# Patient Record
Sex: MALE | Race: WHITE | NOT HISPANIC OR LATINO | Employment: OTHER | URBAN - METROPOLITAN AREA
[De-identification: names, ages, dates, MRNs, and addresses within clinical notes are randomized per-mention and may not be internally consistent; named-entity substitution may affect disease eponyms.]

---

## 2022-01-11 ENCOUNTER — APPOINTMENT (OUTPATIENT)
Dept: GENERAL RADIOLOGY | Facility: CLINIC | Age: 67
End: 2022-01-11
Attending: EMERGENCY MEDICINE
Payer: COMMERCIAL

## 2022-01-11 ENCOUNTER — HOSPITAL ENCOUNTER (EMERGENCY)
Facility: CLINIC | Age: 67
Discharge: HOME | End: 2022-01-11
Attending: EMERGENCY MEDICINE | Admitting: EMERGENCY MEDICINE
Payer: COMMERCIAL

## 2022-01-11 VITALS
TEMPERATURE: 98.2 F | DIASTOLIC BLOOD PRESSURE: 96 MMHG | HEIGHT: 70 IN | WEIGHT: 144 LBS | SYSTOLIC BLOOD PRESSURE: 158 MMHG | HEART RATE: 77 BPM | OXYGEN SATURATION: 93 % | BODY MASS INDEX: 20.62 KG/M2 | RESPIRATION RATE: 20 BRPM

## 2022-01-11 DIAGNOSIS — J20.9 BRONCHITIS WITH BRONCHOSPASM: ICD-10-CM

## 2022-01-11 LAB
ANION GAP SERPL CALCULATED.3IONS-SCNC: 6 MMOL/L (ref 3–14)
BASOPHILS # BLD AUTO: 0.1 10E3/UL (ref 0–0.2)
BASOPHILS NFR BLD AUTO: 1 %
BUN SERPL-MCNC: 9 MG/DL (ref 7–30)
CALCIUM SERPL-MCNC: 9.9 MG/DL (ref 8.5–10.1)
CHLORIDE BLD-SCNC: 105 MMOL/L (ref 94–109)
CO2 SERPL-SCNC: 26 MMOL/L (ref 20–32)
CREAT SERPL-MCNC: 0.97 MG/DL (ref 0.66–1.25)
EOSINOPHIL # BLD AUTO: 0.4 10E3/UL (ref 0–0.7)
EOSINOPHIL NFR BLD AUTO: 5 %
ERYTHROCYTE [DISTWIDTH] IN BLOOD BY AUTOMATED COUNT: 14.8 % (ref 10–15)
GFR SERPL CREATININE-BSD FRML MDRD: 87 ML/MIN/1.73M2
GLUCOSE BLD-MCNC: 116 MG/DL (ref 70–99)
HCT VFR BLD AUTO: 50.6 % (ref 40–53)
HGB BLD-MCNC: 16.2 G/DL (ref 13.3–17.7)
HOLD SPECIMEN: NORMAL
IMM GRANULOCYTES # BLD: 0 10E3/UL
IMM GRANULOCYTES NFR BLD: 0 %
LYMPHOCYTES # BLD AUTO: 1 10E3/UL (ref 0.8–5.3)
LYMPHOCYTES NFR BLD AUTO: 15 %
MCH RBC QN AUTO: 30.4 PG (ref 26.5–33)
MCHC RBC AUTO-ENTMCNC: 32 G/DL (ref 31.5–36.5)
MCV RBC AUTO: 95 FL (ref 78–100)
MONOCYTES # BLD AUTO: 0.8 10E3/UL (ref 0–1.3)
MONOCYTES NFR BLD AUTO: 12 %
NEUTROPHILS # BLD AUTO: 4.4 10E3/UL (ref 1.6–8.3)
NEUTROPHILS NFR BLD AUTO: 67 %
NRBC # BLD AUTO: 0 10E3/UL
NRBC BLD AUTO-RTO: 0 /100
PLATELET # BLD AUTO: 396 10E3/UL (ref 150–450)
POTASSIUM BLD-SCNC: 4.2 MMOL/L (ref 3.4–5.3)
RBC # BLD AUTO: 5.33 10E6/UL (ref 4.4–5.9)
SODIUM SERPL-SCNC: 137 MMOL/L (ref 133–144)
TROPONIN I SERPL HS-MCNC: 5 NG/L
WBC # BLD AUTO: 6.7 10E3/UL (ref 4–11)

## 2022-01-11 PROCEDURE — 99285 EMERGENCY DEPT VISIT HI MDM: CPT | Mod: 25

## 2022-01-11 PROCEDURE — 250N000012 HC RX MED GY IP 250 OP 636 PS 637: Performed by: EMERGENCY MEDICINE

## 2022-01-11 PROCEDURE — 250N000009 HC RX 250: Performed by: EMERGENCY MEDICINE

## 2022-01-11 PROCEDURE — 93005 ELECTROCARDIOGRAM TRACING: CPT

## 2022-01-11 PROCEDURE — 80048 BASIC METABOLIC PNL TOTAL CA: CPT | Performed by: EMERGENCY MEDICINE

## 2022-01-11 PROCEDURE — 94640 AIRWAY INHALATION TREATMENT: CPT

## 2022-01-11 PROCEDURE — 71046 X-RAY EXAM CHEST 2 VIEWS: CPT

## 2022-01-11 PROCEDURE — 85025 COMPLETE CBC W/AUTO DIFF WBC: CPT | Performed by: EMERGENCY MEDICINE

## 2022-01-11 PROCEDURE — 84484 ASSAY OF TROPONIN QUANT: CPT | Performed by: EMERGENCY MEDICINE

## 2022-01-11 PROCEDURE — 36415 COLL VENOUS BLD VENIPUNCTURE: CPT | Performed by: EMERGENCY MEDICINE

## 2022-01-11 RX ORDER — IPRATROPIUM BROMIDE AND ALBUTEROL SULFATE 2.5; .5 MG/3ML; MG/3ML
3 SOLUTION RESPIRATORY (INHALATION) ONCE
Status: COMPLETED | OUTPATIENT
Start: 2022-01-11 | End: 2022-01-11

## 2022-01-11 RX ORDER — PREDNISONE 20 MG/1
60 TABLET ORAL ONCE
Status: COMPLETED | OUTPATIENT
Start: 2022-01-11 | End: 2022-01-11

## 2022-01-11 RX ORDER — PREDNISONE 10 MG/1
TABLET ORAL
Qty: 7 TABLET | Refills: 0 | Status: SHIPPED | OUTPATIENT
Start: 2022-01-11

## 2022-01-11 RX ADMIN — IPRATROPIUM BROMIDE AND ALBUTEROL SULFATE 3 ML: .5; 3 SOLUTION RESPIRATORY (INHALATION) at 15:42

## 2022-01-11 RX ADMIN — PREDNISONE 60 MG: 20 TABLET ORAL at 16:40

## 2022-01-11 ASSESSMENT — ENCOUNTER SYMPTOMS
SHORTNESS OF BREATH: 1
COUGH: 1
BACK PAIN: 0
WHEEZING: 1
APPETITE CHANGE: 1
ABDOMINAL PAIN: 0
NAUSEA: 0
PALPITATIONS: 0
UNEXPECTED WEIGHT CHANGE: 1

## 2022-01-11 ASSESSMENT — MIFFLIN-ST. JEOR: SCORE: 1444.43

## 2022-01-11 NOTE — ED PROVIDER NOTES
History   Chief Complaint:  Abnormal Ekg and Shortness of Breath       HPI   Jacob Minor is a 66 year old male with history of anxiety, polycythemia vera, chronic sinusisits eosinophilic pneumonia, alcohol abuse, and PTSD who presents with shortness of breath. The patient states that he began experiencing a productive cough, shortness of breath and wheezing 2 weeks ago after receiving his flu vaccine. He measured his SpO2 which read 89 and he was able to bring it up to 91 after walking. He visited Urgent Care last week and had an unremarkable chest XR, so he was sent home with an albuterol inhaler. He has continued to have shortness of breath and wheezing for the past week. He went to Urgent Care again today and a repeat EKG showed abnormal results so they reccommended he come to the ED. He states that his shortness of breath is worse with exertion and tried using his albuterol inhaler today with no relief. He also mentions having a decrease in his appetite and has lost weight. The patient has a history of chronic eosinophilic pneumonia and increased his prednisone prescription to manage this. He follows up with a doctor regularly. Denies any abdominal pain, leg swelling, nausea, back pain, or palpitations. Also denies any recent travel or history of blood clots. Patient does drink 5-6 beers everyday.    Review of Systems   Constitutional: Positive for appetite change and unexpected weight change.   Respiratory: Positive for cough, shortness of breath and wheezing.    Cardiovascular: Negative for palpitations and leg swelling.   Gastrointestinal: Negative for abdominal pain and nausea.   Musculoskeletal: Negative for back pain.   All other systems reviewed and are negative.      Allergies:  Sulfa Drugs    Medications:  Sildenafil   Gabapentin   Hydrea   Prednisone     Past Medical History:     Anxiety   Sleep apnea   Eosinophilic pneumonia  Depressive disorder   Polycythemia vera   Sinusitis   Male erectile  "disorder   Keratosis    Alcohol abuse  PTSD   Stroke   PFO     Family History:    Cancer   Cataract   Macular degeneration   Stroke   Alcohol abuse   Depression     Social History:  Patient presents with his wife   Denies any tobacco use   Patient does drink alcohol     Physical Exam     Patient Vitals for the past 24 hrs:   BP Temp Temp src Pulse Resp SpO2 Height Weight   01/11/22 1630 (!) 153/91 -- -- 89 17 90 % -- --   01/11/22 1615 -- -- -- 82 22 92 % -- --   01/11/22 1600 (!) 167/102 -- -- 97 16 93 % -- --   01/11/22 1545 (!) 138/95 -- -- 79 15 92 % -- --   01/11/22 1530 (!) 146/94 -- -- 77 13 91 % -- --   01/11/22 1500 (!) 163/100 -- -- 88 20 92 % -- --   01/11/22 1430 (!) 155/144 -- -- 105 24 93 % -- --   01/11/22 1400 (!) 178/95 -- -- 85 26 93 % -- --   01/11/22 1330 (!) 168/97 -- -- 77 15 93 % -- --   01/11/22 1300 (!) 155/93 -- -- 79 15 92 % -- --   01/11/22 1255 (!) 178/99 -- -- 80 23 94 % -- --   01/11/22 1240 (!) 194/108 -- -- 81 18 94 % -- --   01/11/22 1223 (!) 191/107 98.2  F (36.8  C) Oral 89 18 94 % 1.778 m (5' 10\") 65.3 kg (144 lb)       Physical Exam  Eye:  Pupils are equal, round, and reactive.  Extraocular movements intact.    ENT:  No rhinorrhea.  Moist mucus membranes.  Normal tongue and tonsil.    Cardiac:  Regular rate and rhythm.  No murmurs, gallops, or rubs.    Pulmonary:  Clear to auscultation bilaterally.  No wheezes, rales, or rhonchi.    Abdomen:  Positive bowel sounds.  Abdomen is soft and non-distended, without focal tenderness.    Musculoskeletal:  Normal movement of all extremities without evidence for deficit.    Skin:  Warm and dry without rashes.    Neurologic:  Non-focal exam without asymmetric weakness or numbness.     Psychiatric:  Normal affect with appropriate interaction with examiner.    Emergency Department Course   ECG  ECG obtained at 1222, ECG read at 1240  Normal sinus rhythm   Septal infarct, age undetermined    Rate 84 bpm. OK interval 158 ms. QRS duration 84 ms. " QT/QTc 340/401 ms. P-R-T axes 78 12 73.     Imaging:  Chest XR,  PA & LAT   Final Result   IMPRESSION: No focal infiltrate, pleural effusion or pneumothorax. The   cardiac and mediastinal silhouettes are normal.      RADHA RICHARDSON MD            SYSTEM ID:  [id number]        Report per radiology    Laboratory:  Labs Ordered and Resulted from Time of ED Arrival to Time of ED Departure   BASIC METABOLIC PANEL - Abnormal       Result Value    Sodium 137      Potassium 4.2      Chloride 105      Carbon Dioxide (CO2) 26      Anion Gap 6      Urea Nitrogen 9      Creatinine 0.97      Calcium 9.9      Glucose 116 (*)     GFR Estimate 87     TROPONIN I - Normal    Troponin I High Sensitivity 5     CBC WITH PLATELETS AND DIFFERENTIAL    WBC Count 6.7      RBC Count 5.33      Hemoglobin 16.2      Hematocrit 50.6      MCV 95      MCH 30.4      MCHC 32.0      RDW 14.8      Platelet Count 396      % Neutrophils 67      % Lymphocytes 15      % Monocytes 12      % Eosinophils 5      % Basophils 1      % Immature Granulocytes 0      NRBCs per 100 WBC 0      Absolute Neutrophils 4.4      Absolute Lymphocytes 1.0      Absolute Monocytes 0.8      Absolute Eosinophils 0.4      Absolute Basophils 0.1      Absolute Immature Granulocytes 0.0      Absolute NRBCs 0.0          Emergency Department Course:  Reviewed:  I reviewed nursing notes, vitals, past medical history and Care Everywhere    Assessments:  1345 I obtained history and examined the patient as noted above.   1443 I rechecked the patient and explained findings.   1559 I rechecked the patient and explained findings.     Interventions:  1542 Duoneb, 3 mL, nebulization     Disposition:  The patient was discharged to home.     Impression & Plan     CMS Diagnoses: None    Medical Decision Making:  This unfortunate 66-year-old man with a history of eosinophilic pneumonia, on chronic prednisone, presents to us with progressive cough and wheezing over these past 10 to 12 days.  He  describes what seems to be consistent with a typical upper respiratory infection, with nasal congestion, cough, and wheezing.  However, the wheezing is new for him.  He was evaluated at an urgent care 7 days ago where he underwent a chest x-ray and a COVID test, both of which were unremarkable.  He was told to continue on his medications and he personally increased his prednisone from 2-1/2 mg to 5 mg.  However, he continues to feel tight throughout his chest and wheezy.  He has a home pulse oximeter and found that his oxygenation has been sitting in the mid to low 90s with only a few times dipping to 89 but immediately coming back up.  He has never had a fever with any of this.  He spoke with his pulmonologist who recommended he go back to urgent care today for another chest x-ray and COVID test.  He was also given an inhaler, upon using he noted significant improvement in his breathing.  When he arrived at the urgent care, they started with an EKG, and with evidence of Q waves in V3, they were concerned that he may have had shown signs of a heart attack and advised him to come to the emergency department.  He also underwent another COVID test at the urgent care.    At the time of my assessment, he is sitting comfortably on the bed, not on supplemental oxygen, with oxygenation in the 93 to 94% range.  On exam, he has diffuse wheezing in all lung fields.  He also has a dry cough.  He shows no evidence of lower extremity edema.  Repeat chest x-ray continues to be clear.  His laboratory investigations unremarkable including a negative troponin.  His EKG for us is unremarkable.    With this, I believe that this is a typical case of a bronchitis with associated bronchospasm.  He does not have a history of prior bronchospasm, and I do wonder whether he may have picked up RSV or one of the other viruses that have propensity to grow in the lower respiratory tree and cause bronchospasm.  Plan will be to treat him  accordingly, providing him with a DuoNeb here which caused significant improvement in his breathing and increasing his oxygenation.  We will give him a dose of 60 mg of prednisone here and then place him on a prednisone burst and taper over these next 2 weeks.  He will hopefully find improvement with this and will return to his daily 2-1/2 mg dose at that time.  He has no fever or elevated white blood cell count.  His chest x-ray is clear.  I see no indication for antibiotics at this time.  Otherwise, he will follow-up with pulmonology later in the week for recheck with immediate return to us for any worsening of his breathing, oxygenation below 90% at home, or if he has any other emergent concerns.    Diagnosis:    ICD-10-CM    1. Bronchitis with bronchospasm  J20.9        Discharge Medications:  New Prescriptions    PREDNISONE (DELTASONE) 10 MG TABLET    4 tabs po every day x 3 days, then 3 tabs po every day x 3 days, then 2 tabs po every day x 3 days, then 1 tab po every day x 3 days, then 1/2 tab po every day x 4 days.       Scribe Disclosure:  I, Zoë Harrison, am serving as a scribe at 12:42 PM on 1/11/2022 to document services personally performed by Trierweiler, Chad A, MD based on my observations and the provider's statements to me.          Trierweiler, Chad A, MD  01/12/22 1665

## 2022-01-11 NOTE — ED TRIAGE NOTES
Pt sent here from urgent care for EKG changes. He states that he went to  today for ongoing SOB. They told him that he could possibly have had a heart attack a few weeks ago to a few years ago. Pt states that his SOB started new years day. He reports that he went to  a week ago for this and his chest xray was clear and that he didn't have COVID. Today they did some more tests and told him to come to the ED. Pt denies any chest pain. Pt A&Ox4

## 2022-01-12 LAB
ATRIAL RATE - MUSE: 84 BPM
DIASTOLIC BLOOD PRESSURE - MUSE: NORMAL MMHG
INTERPRETATION ECG - MUSE: NORMAL
P AXIS - MUSE: 78 DEGREES
PR INTERVAL - MUSE: 158 MS
QRS DURATION - MUSE: 84 MS
QT - MUSE: 340 MS
QTC - MUSE: 401 MS
R AXIS - MUSE: 12 DEGREES
SYSTOLIC BLOOD PRESSURE - MUSE: NORMAL MMHG
T AXIS - MUSE: 73 DEGREES
VENTRICULAR RATE- MUSE: 84 BPM